# Patient Record
Sex: FEMALE | Race: WHITE | Employment: OTHER | URBAN - METROPOLITAN AREA
[De-identification: names, ages, dates, MRNs, and addresses within clinical notes are randomized per-mention and may not be internally consistent; named-entity substitution may affect disease eponyms.]

---

## 2019-01-01 ENCOUNTER — APPOINTMENT (OUTPATIENT)
Dept: GENERAL RADIOLOGY | Facility: HOSPITAL | Age: 74
DRG: 640 | End: 2019-01-01
Attending: INTERNAL MEDICINE
Payer: MEDICARE

## 2019-01-01 ENCOUNTER — HOSPITAL ENCOUNTER (INPATIENT)
Facility: HOSPITAL | Age: 74
LOS: 5 days | DRG: 640 | End: 2019-01-01
Attending: EMERGENCY MEDICINE | Admitting: HOSPITALIST
Payer: MEDICARE

## 2019-01-01 ENCOUNTER — APPOINTMENT (OUTPATIENT)
Dept: CT IMAGING | Facility: HOSPITAL | Age: 74
DRG: 640 | End: 2019-01-01
Attending: EMERGENCY MEDICINE
Payer: MEDICARE

## 2019-01-01 ENCOUNTER — APPOINTMENT (OUTPATIENT)
Dept: GENERAL RADIOLOGY | Facility: HOSPITAL | Age: 74
DRG: 640 | End: 2019-01-01
Attending: EMERGENCY MEDICINE
Payer: MEDICARE

## 2019-01-01 ENCOUNTER — APPOINTMENT (OUTPATIENT)
Dept: CT IMAGING | Facility: HOSPITAL | Age: 74
DRG: 640 | End: 2019-01-01
Attending: HOSPITALIST
Payer: MEDICARE

## 2019-01-01 ENCOUNTER — APPOINTMENT (OUTPATIENT)
Dept: GENERAL RADIOLOGY | Facility: HOSPITAL | Age: 74
DRG: 640 | End: 2019-01-01
Attending: HOSPITALIST
Payer: MEDICARE

## 2019-01-01 ENCOUNTER — APPOINTMENT (OUTPATIENT)
Dept: CV DIAGNOSTICS | Facility: HOSPITAL | Age: 74
DRG: 640 | End: 2019-01-01
Attending: INTERNAL MEDICINE
Payer: MEDICARE

## 2019-01-01 ENCOUNTER — APPOINTMENT (OUTPATIENT)
Dept: GENERAL RADIOLOGY | Facility: HOSPITAL | Age: 74
DRG: 640 | End: 2019-01-01
Attending: CLINICAL NURSE SPECIALIST
Payer: MEDICARE

## 2019-01-01 VITALS
WEIGHT: 126.31 LBS | BODY MASS INDEX: 22.95 KG/M2 | RESPIRATION RATE: 14 BRPM | HEART RATE: 84 BPM | HEIGHT: 62.01 IN | DIASTOLIC BLOOD PRESSURE: 33 MMHG | OXYGEN SATURATION: 83 % | TEMPERATURE: 96 F | SYSTOLIC BLOOD PRESSURE: 50 MMHG

## 2019-01-01 DIAGNOSIS — E87.2 LACTIC ACIDOSIS: Primary | ICD-10-CM

## 2019-01-01 DIAGNOSIS — E87.6 HYPOKALEMIA: ICD-10-CM

## 2019-01-01 DIAGNOSIS — K52.9 GASTROENTERITIS: ICD-10-CM

## 2019-01-01 DIAGNOSIS — N17.9 AKI (ACUTE KIDNEY INJURY) (HCC): ICD-10-CM

## 2019-01-01 PROCEDURE — 0BH17EZ INSERTION OF ENDOTRACHEAL AIRWAY INTO TRACHEA, VIA NATURAL OR ARTIFICIAL OPENING: ICD-10-PCS | Performed by: HOSPITALIST

## 2019-01-01 PROCEDURE — 99291 CRITICAL CARE FIRST HOUR: CPT | Performed by: INTERNAL MEDICINE

## 2019-01-01 PROCEDURE — 74019 RADEX ABDOMEN 2 VIEWS: CPT | Performed by: HOSPITALIST

## 2019-01-01 PROCEDURE — 93306 TTE W/DOPPLER COMPLETE: CPT | Performed by: INTERNAL MEDICINE

## 2019-01-01 PROCEDURE — 71045 X-RAY EXAM CHEST 1 VIEW: CPT | Performed by: HOSPITALIST

## 2019-01-01 PROCEDURE — 06HY33Z INSERTION OF INFUSION DEVICE INTO LOWER VEIN, PERCUTANEOUS APPROACH: ICD-10-PCS | Performed by: HOSPITALIST

## 2019-01-01 PROCEDURE — 5A12012 PERFORMANCE OF CARDIAC OUTPUT, SINGLE, MANUAL: ICD-10-PCS | Performed by: HOSPITALIST

## 2019-01-01 PROCEDURE — 99233 SBSQ HOSP IP/OBS HIGH 50: CPT | Performed by: HOSPITALIST

## 2019-01-01 PROCEDURE — 30233R1 TRANSFUSION OF NONAUTOLOGOUS PLATELETS INTO PERIPHERAL VEIN, PERCUTANEOUS APPROACH: ICD-10-PCS | Performed by: HOSPITALIST

## 2019-01-01 PROCEDURE — 71045 X-RAY EXAM CHEST 1 VIEW: CPT | Performed by: CLINICAL NURSE SPECIALIST

## 2019-01-01 PROCEDURE — 74176 CT ABD & PELVIS W/O CONTRAST: CPT | Performed by: EMERGENCY MEDICINE

## 2019-01-01 PROCEDURE — 99223 1ST HOSP IP/OBS HIGH 75: CPT | Performed by: HOSPITALIST

## 2019-01-01 PROCEDURE — 5A1935Z RESPIRATORY VENTILATION, LESS THAN 24 CONSECUTIVE HOURS: ICD-10-PCS | Performed by: HOSPITALIST

## 2019-01-01 PROCEDURE — 99223 1ST HOSP IP/OBS HIGH 75: CPT | Performed by: UROLOGY

## 2019-01-01 PROCEDURE — 71045 X-RAY EXAM CHEST 1 VIEW: CPT | Performed by: EMERGENCY MEDICINE

## 2019-01-01 PROCEDURE — 70450 CT HEAD/BRAIN W/O DYE: CPT | Performed by: HOSPITALIST

## 2019-01-01 RX ORDER — SODIUM CHLORIDE 0.9 % (FLUSH) 0.9 %
10 SYRINGE (ML) INJECTION AS NEEDED
Status: DISCONTINUED | OUTPATIENT
Start: 2019-01-01 | End: 2019-01-01

## 2019-01-01 RX ORDER — ASPIRIN 81 MG/1
81 TABLET, CHEWABLE ORAL DAILY
Status: DISCONTINUED | OUTPATIENT
Start: 2019-01-01 | End: 2019-01-01

## 2019-01-01 RX ORDER — HEPARIN SODIUM 5000 [USP'U]/ML
5000 INJECTION, SOLUTION INTRAVENOUS; SUBCUTANEOUS EVERY 12 HOURS SCHEDULED
Status: DISCONTINUED | OUTPATIENT
Start: 2019-01-01 | End: 2019-01-01

## 2019-01-01 RX ORDER — IPRATROPIUM BROMIDE AND ALBUTEROL SULFATE 2.5; .5 MG/3ML; MG/3ML
3 SOLUTION RESPIRATORY (INHALATION) EVERY 6 HOURS PRN
Status: DISCONTINUED | OUTPATIENT
Start: 2019-01-01 | End: 2019-01-01

## 2019-01-01 RX ORDER — ONDANSETRON 2 MG/ML
4 INJECTION INTRAMUSCULAR; INTRAVENOUS EVERY 6 HOURS PRN
Status: DISCONTINUED | OUTPATIENT
Start: 2019-01-01 | End: 2019-01-01

## 2019-01-01 RX ORDER — POTASSIUM CHLORIDE 20 MEQ/1
20 TABLET, EXTENDED RELEASE ORAL DAILY
COMMUNITY
Start: 2019-01-01

## 2019-01-01 RX ORDER — METOPROLOL SUCCINATE 50 MG/1
50 TABLET, EXTENDED RELEASE ORAL DAILY
COMMUNITY
Start: 2018-01-01

## 2019-01-01 RX ORDER — METOCLOPRAMIDE HYDROCHLORIDE 5 MG/ML
5 INJECTION INTRAMUSCULAR; INTRAVENOUS EVERY 6 HOURS
Status: DISCONTINUED | OUTPATIENT
Start: 2019-01-01 | End: 2019-01-01

## 2019-01-01 RX ORDER — METOPROLOL SUCCINATE 25 MG/1
25 TABLET, EXTENDED RELEASE ORAL DAILY
Status: DISCONTINUED | OUTPATIENT
Start: 2019-01-01 | End: 2019-01-01

## 2019-01-01 RX ORDER — ATORVASTATIN CALCIUM 40 MG/1
40 TABLET, FILM COATED ORAL NIGHTLY
Status: DISCONTINUED | OUTPATIENT
Start: 2019-01-01 | End: 2019-01-01

## 2019-01-01 RX ORDER — CLOPIDOGREL BISULFATE 75 MG/1
75 TABLET ORAL DAILY
COMMUNITY
Start: 2018-01-01

## 2019-01-01 RX ORDER — SODIUM CHLORIDE 9 MG/ML
INJECTION, SOLUTION INTRAVENOUS ONCE
Status: DISCONTINUED | OUTPATIENT
Start: 2019-01-01 | End: 2019-01-01

## 2019-01-01 RX ORDER — POTASSIUM CHLORIDE 20 MEQ/1
40 TABLET, EXTENDED RELEASE ORAL EVERY 4 HOURS
Status: COMPLETED | OUTPATIENT
Start: 2019-01-01 | End: 2019-01-01

## 2019-01-01 RX ORDER — HEPARIN SODIUM AND DEXTROSE 10000; 5 [USP'U]/100ML; G/100ML
INJECTION INTRAVENOUS CONTINUOUS
Status: DISCONTINUED | OUTPATIENT
Start: 2019-01-01 | End: 2019-01-01

## 2019-01-01 RX ORDER — ONDANSETRON 2 MG/ML
8 INJECTION INTRAMUSCULAR; INTRAVENOUS EVERY 4 HOURS PRN
Status: DISCONTINUED | OUTPATIENT
Start: 2019-01-01 | End: 2019-01-01

## 2019-01-01 RX ORDER — METOCLOPRAMIDE HYDROCHLORIDE 5 MG/ML
10 INJECTION INTRAMUSCULAR; INTRAVENOUS EVERY 8 HOURS PRN
Status: DISCONTINUED | OUTPATIENT
Start: 2019-01-01 | End: 2019-01-01

## 2019-01-01 RX ORDER — MAGNESIUM SULFATE HEPTAHYDRATE 40 MG/ML
2 INJECTION, SOLUTION INTRAVENOUS ONCE
Status: COMPLETED | OUTPATIENT
Start: 2019-01-01 | End: 2019-01-01

## 2019-01-01 RX ORDER — HEPARIN SODIUM AND DEXTROSE 10000; 5 [USP'U]/100ML; G/100ML
12 INJECTION INTRAVENOUS ONCE
Status: DISCONTINUED | OUTPATIENT
Start: 2019-01-01 | End: 2019-01-01

## 2019-01-01 RX ORDER — METOCLOPRAMIDE HYDROCHLORIDE 5 MG/ML
5 INJECTION INTRAMUSCULAR; INTRAVENOUS EVERY 8 HOURS PRN
Status: DISCONTINUED | OUTPATIENT
Start: 2019-01-01 | End: 2019-01-01

## 2019-01-01 RX ORDER — ACETAMINOPHEN 325 MG/1
650 TABLET ORAL EVERY 6 HOURS PRN
Status: DISCONTINUED | OUTPATIENT
Start: 2019-01-01 | End: 2019-01-01

## 2019-01-01 RX ORDER — CLOPIDOGREL BISULFATE 75 MG/1
75 TABLET ORAL DAILY
Status: DISCONTINUED | OUTPATIENT
Start: 2019-01-01 | End: 2019-01-01

## 2019-01-01 RX ORDER — HEPARIN SODIUM 1000 [USP'U]/ML
60 INJECTION, SOLUTION INTRAVENOUS; SUBCUTANEOUS ONCE
Status: DISCONTINUED | OUTPATIENT
Start: 2019-01-01 | End: 2019-01-01

## 2019-01-01 RX ORDER — POTASSIUM CHLORIDE 1.5 G/1.77G
40 POWDER, FOR SOLUTION ORAL ONCE
Status: COMPLETED | OUTPATIENT
Start: 2019-01-01 | End: 2019-01-01

## 2019-01-01 RX ORDER — MORPHINE SULFATE 4 MG/ML
4 INJECTION, SOLUTION INTRAMUSCULAR; INTRAVENOUS ONCE
Status: DISCONTINUED | OUTPATIENT
Start: 2019-01-01 | End: 2019-01-01

## 2019-01-01 RX ORDER — SODIUM CHLORIDE 9 MG/ML
INJECTION, SOLUTION INTRAVENOUS CONTINUOUS
Status: DISCONTINUED | OUTPATIENT
Start: 2019-01-01 | End: 2019-01-01

## 2019-01-01 RX ORDER — ONDANSETRON 2 MG/ML
4 INJECTION INTRAMUSCULAR; INTRAVENOUS ONCE
Status: COMPLETED | OUTPATIENT
Start: 2019-01-01 | End: 2019-01-01

## 2019-01-01 RX ORDER — LOSARTAN POTASSIUM 50 MG/1
50 TABLET ORAL DAILY
COMMUNITY
Start: 2019-01-01

## 2019-01-01 RX ORDER — SODIUM CHLORIDE 0.9 % (FLUSH) 0.9 %
3 SYRINGE (ML) INJECTION AS NEEDED
Status: DISCONTINUED | OUTPATIENT
Start: 2019-01-01 | End: 2019-01-01

## 2019-04-05 PROBLEM — E87.2 LACTIC ACIDOSIS: Status: ACTIVE | Noted: 2019-01-01

## 2019-04-06 PROBLEM — N17.9 AKI (ACUTE KIDNEY INJURY) (HCC): Status: ACTIVE | Noted: 2019-01-01

## 2019-04-06 PROBLEM — E87.6 HYPOKALEMIA: Status: ACTIVE | Noted: 2019-01-01

## 2019-04-06 PROBLEM — K52.9 GASTROENTERITIS: Status: ACTIVE | Noted: 2019-01-01

## 2019-04-06 NOTE — ED NOTES
Pt brought to ED by son who went to visit today after his father reported that pt was \"not doing well\". Son and father attempted to get pt into bathtub, but pt was not able to stand. Son reports that pt is \"very weak\" compared to her baseline.   Per s

## 2019-04-06 NOTE — H&P
[facility]    PATIENT'S NAME: Zahira Shannon PHYSICIAN: Dean Luna MD   PATIENT ACCOUNT#:   [id number]    LOCATION:  [address] [place] [address] #:   [id number]       YOB: 1945  ADMISSION DATE:       04/0 squamous epithelial cells and no bacteria. Chest x-ray revealed no acute cardiopulmonary disease. ICD was present. EKG revealed left bundle-branch block, sinus rhythm, with no previous EKG for comparison. Potassium was 2.5. Glucose was 171.   CT scan o an MI; she also had a tumor on her back. Her father  at 68 of an MI.    SOCIAL HISTORY:  The patient was a smoker, has at least a 35-pack-year history of tobacco use but quit in  after her heart attack. She does not drink, does not use drugs.   Sh diarrhea. NEUROLOGIC:  The patient was sleepy but arousable, able to provide a decent history. She was generally weak, but there were no focal motor or sensory deficits. PSYCHIATRIC:  Her mood and affect were depressed but cooperative.   BACK:  There was lack of information. There is no Care Everywhere in the computer. If we continue to follow the patient, we will try to obtain medical records from [place].   3.   Coronary artery disease, status post myocardial infarction in 2005 with cardiac arrest, pacem Will hold heparin because of the low platelet count. 12.   The patient's current clinical status and proposed treatment plan were discussed with her and her family at the bedside.   All their questions were answered, and they agreed with the plan of care

## 2019-04-06 NOTE — PLAN OF CARE
Problem: Patient Centered Care  Goal: Patient preferences are identified and integrated in the patient's plan of care  Description  Interventions:  - What would you like us to know as we care for you? Home with  and son.  Hx of st. 4 liver CA- on r Larissa Tay, RN  Outcome: Progressing     Problem: PAIN - ADULT  Goal: Verbalizes/displays adequate comfort level or patient's stated pain goal  Description  INTERVENTIONS:  - Encourage pt to monitor pain and request assistance  - Assess pain using

## 2019-04-06 NOTE — ED PROVIDER NOTES
Patient Seen in: [facility] AND [facility] Emergency Department    History   Patient presents with:  Dehydration (metabolic/constitutional)  Nausea/Vomiting/Diarrhea (gastrointestinal)    Stated Complaint: NVD, hx cancer    HPI    78-year-old female with past me post OP clear with no exudates  Eyes: Conjunctivae and EOM are normal. PERRLA  Neck: Normal range of motion. Neck supple. No tracheal deviation present. CV: s1s2+, RRR, no m/r/g, normal distal pulses  Pulmonary/Chest: CTA b/l with no rales, wheezes.   No RDW 18.1 (*)     PLT 53.0 (*)     Lymphocyte Absolute 0.64 (*)     All other components within normal limits   MAGNESIUM - Normal   CBC WITH DIFFERENTIAL WITH PLATELET    Narrative:      The following orders were created for panel order CBC WITH DIFFERENTIA above.    DD:  04/05/2019/DT:  04/06/2019        MDM   Patient has significant dehydration with lactic acidosis of 8. She has severe hypokalemia with a potassium of 2.5. She also has some mild acute kidney injury.   Patient is starting to feel somewhat be

## 2019-04-06 NOTE — PROGRESS NOTES
Patient was admitted over night. Patient is unsure of home medications. Attempted to call , Quinn, via telephone to obtain med rec and there was no response. Will attempt to call again at another time.

## 2019-04-07 NOTE — PLAN OF CARE
Problem: Patient Centered Care  Goal: Patient preferences are identified and integrated in the patient's plan of care  Description  Interventions:  - What would you like us to know as we care for you? Lives at home with  and son.   - Provide timely evaluate response  - Implement non-pharmacological measures as appropriate and evaluate response  - Consider cultural and social influences on pain and pain management  - Manage/alleviate anxiety  - Utilize distraction and/or relaxation techniques  - Monit

## 2019-04-07 NOTE — PROGRESS NOTES
[place] [facility] - [facility]    Progress Note    Agnes Major Patient Status:  Inpatient    1945 MRN [id number]   Location [facility] 3W/SW Attending Anne Bee Day # 1 PCP Renee Arguello MD        Subjective:     Co me whether he is admitting to [facility] AND [facility] or not. Will talk with the daytime hospitalist and consider discussing her case with Dr. Nixon Knowles, if available. He may wish to transfer care as well.   We are working with a significant lack of informati thrombocytopenia. Platelet count is above 50,000 and slide is to be reviewed. Will await review of slide. If the patient's platelets are clumped, then the total number is likely normal.  Again, will try to obtain records from [facility], including labs.   6 acute cardiopulmonary disease. 2. ICD with transvenous leads in right atrium and right ventricle.     Dictated by (CST): Mamadou Robbins MD on 4/06/2019 at 6:33     Approved by (CST): Mamadou Robbins MD on 4/06/2019 at 6:34          Ekg 12-lead    R

## 2019-04-07 NOTE — PHYSICAL THERAPY NOTE
PHYSICAL THERAPY EVALUATION - INPATIENT     Room Number: 321/321-A  Evaluation Date: 4/7/2019  Type of Evaluation: New  Physician Order: PT Eval and Treat    Presenting Problem: dehydration, dry mouth, lactic acidosis  Reason for Therapy: Mobility Dysfunc acidosis  Active Problems:    Gastroenteritis    Hypokalemia    LUCY (acute kidney injury) [facility])      Past Medical History  Past Medical History:   Diagnosis Date   • Liver cancer [facility])        Past Surgical History  History reviewed.  No pertinent surgical h etc.): A Lot   -   Moving from lying on back to sitting on the side of the bed?: A Lot   How much help from another person does the patient currently need. ..   -   Moving to and from a bed to a chair (including a wheelchair)?: A Lot   -   Need to walk in h

## 2019-04-07 NOTE — PLAN OF CARE
Problem: Patient Centered Care  Goal: Patient preferences are identified and integrated in the patient's plan of care  Description  Interventions:  - What would you like us to know as we care for you?  I live at home with my   - Provide timely, com cardiac monitoring, monitor vital signs, obtain 12 lead EKG if indicated  - Evaluate effectiveness of antiarrhythmic and heart rate control medications as ordered  - Initiate emergency measures for life threatening arrhythmias  - Monitor electrolytes and a and/or skin breakdown  - Initiate interventions, skin care algorithm/standards of care as needed  Outcome: Progressing  Goal: Oral mucous membranes remain intact  Description  INTERVENTIONS  - Assess oral mucosa and hygiene practices  - Implement preventat

## 2019-04-07 NOTE — OCCUPATIONAL THERAPY NOTE
OCCUPATIONAL THERAPY EVALUATION - INPATIENT     Room Number: 321/321-A  Evaluation Date: 4/7/2019  Type of Evaluation: Initial  Presenting Problem: N/V/D, weakness and confusion ; severe dehydration with lactic acidosis    Physician Order: IP Consult to [facility] Pt with BM --provided rose care and linen change with total A. Pt provided CGA for log rolling x 3 times.   Pt's upper extremity strength and range of motion are [facility] for oral facial hygiene, upper extremity dressing, and self- feeding in supported sitting p Impaired  Arousal/Alertness:  lethargic  Orientation Level:  oriented to person, disoriented to place, disoriented to time and disoriented to situation  Memory:  decreased recall of biographical information and decreased recall of recent events  Following complete toileting with Min A  Comment:     Patient will tolerate standing for 4 minutes in prep for adls with Min A   Comment:    Patient will complete grooming in sitting EOB with SBA for sitting balance   Comment:          Goals  on:   Alysha Lopez

## 2019-04-07 NOTE — PROGRESS NOTES
Pt seen and examined, following on night admission, for details, please see H&P  Feels somewhat better, no vomiting, feels thirsty  No diarrhea  More awake  No chest pain, no palpitations  -start diet, advance as tolerated  -cont IVF for now  -check B12 an

## 2019-04-08 NOTE — DIETARY NOTE
ADULT NUTRITION INITIAL ASSESSMENT    Pt is at moderate nutrition risk. Pt does not meet malnutrition criteria.       RECOMMENDATIONS TO MD:  See Nutrition Intervention     NUTRITION DIAGNOSIS/PROBLEM:  Inadequate oral intake related to alteration in GI (n declined  Food Allergies: NKFA  Cultural/Ethnic/Gnosticist Preferences: Not Obtained    MEDICATIONS: reviewed  IV fluids(NS) at 50 ml/kp=0853 ml per 24 hours  • Potassium Chloride ER  40 mEq Oral Q4H   • Clopidogrel Bisulfate  75 mg Oral Daily   • Metoprolo

## 2019-04-08 NOTE — PLAN OF CARE
Problem: ALTERED NUTRIENT INTAKE - ADULT  Goal: Nutrient intake appropriate for improving, restoring or maintaining nutritional needs  Description  INTERVENTIONS:  - Assess nutritional status and recommend course of action  - Monitor oral intake, labs, a

## 2019-04-08 NOTE — PLAN OF CARE
Problem: Patient Centered Care  Goal: Patient preferences are identified and integrated in the patient's plan of care  Description  Interventions:  - What would you like us to know as we care for you?   - Provide timely, complete, and accurate informatio color and temperature  - Assess for signs of decreased coronary artery perfusion - ex.  Angina  - Evaluate fluid balance, assess for edema, trend weights  4/8/2019 0018 by Ifeanyi Mack RN  Outcome: Progressing  4/8/2019 0017 by PRIYANKA Briggs patient safety including physical limitations  - Instruct pt to call for assistance with activity based on assessment  - Modify environment to reduce risk of injury  - Provide assistive devices as appropriate  - Consider OT/PT consult to assist with streng

## 2019-04-08 NOTE — OCCUPATIONAL THERAPY NOTE
OCCUPATIONAL THERAPY TREATMENT NOTE - INPATIENT        Room Number: 321/321-A           Presenting Problem: N/V/D, weakness and confusion ; severe dehydration with lactic acidosis    Problem List  Principal Problem:    Lactic acidosis  Active Problems: Position: Lying    O2 SATURATIONS  SPO2 on Room Air at Rest: 94             ACTIVITIES OF DAILY LIVING ASSESSMENT  AM-PAC ‘6-Clicks’ Inpatient Daily Activity Short Form  How much help from another person does the patient currently need…  -   Putting on and  on:   Frequency: 3-4x/wk    Annalise Archibald MA, OTR/L  Occupational Therapist

## 2019-04-08 NOTE — PLAN OF CARE
Problem: Patient/Family Goals  Goal: Patient/Family Long Term Goal  Description  Patient's Long Term Goal:  \"go back to taking care of myself\"    Interventions:  - Work with physical therapy and occupational therapy  - up to chair during meals  - danya Manage/alleviate anxiety  - Utilize distraction and/or relaxation techniques  - Monitor for opioid side effects  - Notify MD/LIP if interventions unsuccessful or patient reports new pain  - Anticipate increased pain with activity and pre-medicate as approp patency with patient fatigue and changes in neurological status  - Encourage and assist patient to increase activity and self care with guidance from PT/OT  - Encourage visually impaired, hearing impaired and aphasic patients to use assistive/communication

## 2019-04-08 NOTE — PHYSICAL THERAPY NOTE
Attempted treatment pt on hold this afternoon per RN to let pt rest. Will follow up tomorrow if appropriate.

## 2019-04-08 NOTE — CM/SW NOTE
SW self-referred to review pt's case due to case finding and dx. SW attempted to call pt's son, Clarissa Lentz [phone] - no answer, no voicemail set up. SW spoke w/ pt's , Gene for initial assessment. Pt lives at home w/ her  in [address] [place] [address].  Pt l

## 2019-04-08 NOTE — PROGRESS NOTES
[facility]    Progress Note    Edmar Gins Patient Status:  Inpatient    1945 MRN [id number]   Location [facility] 3W/SW Attending Anne Bee Day # 2 PCP Bella Slade MD        Subjective:     Co unclear as to whether this is new or old.   4.       Stage IV liver cancer, apparently with radiation treatments.  pt metastatic carcinoid  5.       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  Nonspecific abdominal bowel gas pattern as discussed without

## 2019-04-09 NOTE — PROGRESS NOTES
[place] [facility] - [facility]    Progress Note    Riaz Atkins Patient Status:  Inpatient    1945 MRN [id number]   Location [facility] 3W/SW Attending Anne Bee Day # 3 PCP Mike Ramos MD        Subjective:     Co elderly female with a history of coronary artery disease.   EKG is unhelpful because of the left bundle-branch block, and it is unclear as to whether this is new or old.   4.       Stage IV met carcinoid, apparently with radiation treatments.  pt ct similar care                      Results:     Lab Results   Component Value Date    WBC 7.5 04/09/2019    HGB 9.6 (L) 04/09/2019    HCT 31.7 (L) 04/09/2019    PLT 41.0 (L) 04/09/2019    CREATSERUM 1.43 (H) 04/09/2019    BUN 22 (H) 04/09/2019     04/09/2019

## 2019-04-09 NOTE — PLAN OF CARE
Problem: Patient Centered Care  Goal: Patient preferences are identified and integrated in the patient's plan of care  Description  Interventions:  - What would you like us to know as we care for you?  I live at home with my    - Provide timely, co response  - Implement non-pharmacological measures as appropriate and evaluate response  - Consider cultural and social influences on pain and pain management  - Manage/alleviate anxiety  - Utilize distraction and/or relaxation techniques  - Monitor for op volume within ordered parameters to optimize cerebral perfusion and minimize risk of hemorrhage  - Monitor temperature, glucose, and sodium.  Initiate appropriate interventions as ordered  Outcome: Progressing   Pt in bed, wheels locked, call light within r

## 2019-04-09 NOTE — PHYSICAL THERAPY NOTE
PHYSICAL THERAPY TREATMENT NOTE - INPATIENT     Room Number: 321/321-A       Presenting Problem: dehydration, dry mouth, lactic acidosis    Problem List  Principal Problem:    Lactic acidosis  Active Problems:    Gastroenteritis    Hypokalemia    LUCY (acut Standing: Not tested  Dynamic Standing: Not tested    ACTIVITY TOLERANCE                         O2 WALK                  AM-PAC '6-Clicks' INPATIENT SHORT FORM - BASIC MOBILITY  How much difficulty does the patient currently have. ..  -   Turning over in b minutes CGA   Goal #5 Patient to demonstrate CG with home activity/exercise instructions provided to patient in preparation for discharge.    Goal #5   Current Status IN PROGRESS   Goal #6    Goal #6  Current Status

## 2019-04-09 NOTE — PLAN OF CARE
Problem: Patient Centered Care  Goal: Patient preferences are identified and integrated in the patient's plan of care  Description  Interventions:  - What would you like us to know as we care for you? Usually walks with walker at home.    - Provide timely Problem: PAIN - ADULT  Goal: Verbalizes/displays adequate comfort level or patient's stated pain goal  Description  INTERVENTIONS:  - Encourage pt to monitor pain and request assistance  - Assess pain using appropriate pain scale  - Administer analgesics Bella Durand RN  Outcome: Progressing  4/9/2019 0451 by Ifeanyi Mack RN  Outcome: Progressing  Goal: Oral mucous membranes remain intact  Description  INTERVENTIONS  - Assess oral mucosa and hygiene practices  - Implement preventative oral hygiene regime

## 2019-04-09 NOTE — PROGRESS NOTES
[facility] Pharmacy Note:  Renal Dose Adjustment for Metoclopramide (REGLAN)    Yvonne Hartman has been prescribed Metoclopramide (REGLAN) 10 mg every 6 hours. Estimated Creatinine Clearance: 27.3 mL/min (A) (based on SCr of 1.43 mg/dL (H)).     Her calculated

## 2019-04-09 NOTE — PROGRESS NOTES
[facility] Pharmacy Note:  Renal Dose Adjustment for Metoclopramide (REGLAN)    Kamala Burrell has been prescribed Metoclopramide (REGLAN) 10 mg every 8 hours as needed. Estimated Creatinine Clearance: 27.3 mL/min (A) (based on SCr of 1.43 mg/dL (H)).     Her ca

## 2019-04-10 NOTE — PHYSICAL THERAPY NOTE
Reviewed pt's chart for PT treatment attempt- pt was a CODE BLUE on 3rd floor this a.m. And now on mechanical ventilation (transferred to CCU). Will require updated orders when PT intervention appropriate to resume.   Thank you  Ilsa Gallagher, PT, DPT

## 2019-04-10 NOTE — CONSULTS
[facility]OSP - [facility]    Report of Consultation    Leandro Hollingsworth Patient Status:  Inpatient    1945 MRN [id number]   Location [facility] 2W/SW Attending Henry Jerez MD   Hosp Day # 4 PCP Vu Milton MD     Date of Admission: ondansetron HCl (ZOFRAN) injection 8 mg 8 mg Intravenous Q4H PRN   Vancomycin HCl (FIRVANQ) 50 MG/ML oral solution 125 mg 125 mg Oral Q6H   ipratropium-albuterol (DUONEB) nebulizer solution 3 mL 3 mL Nebulization Q6H PRN   Normal Saline Flush 0.9 % injec Intravenous Once       Continuous Infusions:   • norepinephrine 20 mcg/min (04/10/19 9769)   • sodium chloride 83 mL/hr at 04/10/19 1054       Head: Normocephalic, without obvious abnormality, atraumatic  Pulmonary: clear to auscultation bilaterally  Cardi positioning. 3. No pneumothorax    Dictated by (CST): Sin Covarrubias MD on 4/10/2019 at 7:59     Approved by (CST): Sin Covarrubias MD on 4/10/2019 at 8:04          Xr Chest Ap Portable  (cpt=71045)    Result Date: 4/10/2019  CONCLUSION:  1.  The ET tube

## 2019-04-10 NOTE — PROGRESS NOTES
Paged and spoke with urologist ,Md will be coming soon,no urine output noted on pt.family at bed side.

## 2019-04-10 NOTE — OCCUPATIONAL THERAPY NOTE
Chart reviewed. Pt was a CODE BLUE on 3rd floor this a.m. And now on mechanical ventilation (transferred to CCU). Will require updated orders when OT intervention appropriate to resume.

## 2019-04-10 NOTE — CONSULTS
[facility]OSP - [facility]    Report of Consultation    Leandro Hollingsworth Patient Status:  Inpatient    1945 MRN [id number]   Location [facility] 2W/SW Attending Henry Jerez MD   Hosp Day # 4 PCP Vu Milton MD     Date of Admission: Thrombocytopenia ([facility] [address]      History reviewed. No pertinent surgical history. History reviewed. No pertinent family history.   Social History:  Social History    Tobacco Use      Smoking status: Former Smoker        Packs/day: 0.50        Years: 35.00 ALT 71 (H) 04/10/2019    INR 1.71 (H) 04/10/2019    T4F 0.4 (L) 04/07/2019    TSH 14.600 (H) 04/07/2019     04/10/2019    MG 1.7 04/10/2019    PHOS 4.9 04/10/2019    TROP 0.676 (HH) 04/10/2019    B12 372 04/07/2019     CT A/P 4/6/2019 without IV con

## 2019-04-10 NOTE — PROGRESS NOTES
Patient lethargic but arousable-perks up to verbal communication. Alert to self, aware she's in the hospital-thought it was alexian brothers. Extremities cold, unable to obtain accurate pulse ox. Increased edema to lower extremities. BP 95/52, MD notified.

## 2019-04-10 NOTE — PLAN OF CARE
Problem: Patient Centered Care  Goal: Patient preferences are identified and integrated in the patient's plan of care  Description  Interventions:  - What would you like us to know as we care for you?   - Provide timely, complete, and accurate informatio with IV or PO as ordered and tolerated  - Nasogastric tube to low intermittent suction as ordered  - Evaluate effectiveness of ordered antiemetic medications  - Provide nonpharmacologic comfort measures as appropriate  - Advance diet as tolerated, if order

## 2019-04-10 NOTE — SIGNIFICANT EVENT
TRAVIS SMITH called to room 321. Patient unresponsive, agonal breathing, as per RN no palpable pulse. Patient earlier had been lethargic however responding to verbal commands, denying any complaints at that time.   On my arrival CPR in progress, pulse now

## 2019-04-10 NOTE — PLAN OF CARE
Problem: CARDIOVASCULAR - ADULT  Goal: Maintains optimal cardiac output and hemodynamic stability  Description  INTERVENTIONS:  - Monitor vital signs, rhythm, and trends  - Monitor for bleeding, hypotension and signs of decreased cardiac output  - Evalua pre-medicate as appropriate  4/9/2019 2344 by Dilan Reed RN  Outcome: Progressing  4/9/2019 2339 by Dilan Reed, RN  Outcome: Progressing     Problem: SAFETY ADULT - FALL  Goal: Free from fall injury  Description  INTERVENTIONS:  - Assess pt fr volume within ordered parameters to optimize cerebral perfusion and minimize risk of hemorrhage  - Monitor temperature, glucose, and sodium.  Initiate appropriate interventions as ordered  4/9/2019 2344 by Micah Rankin RN  Outcome: Progressing  4/9/201 Octavio Ridley RN  Outcome: Not Progressing  Goal: Patient/Family Short Term Goal  Description  Patient's Short Term Goal: \"feel better\"    Interventions:   - IVF  - Clear liquid diet and advance as tolerated  - PT/OT consult  -   - See additional Car

## 2019-04-10 NOTE — CONSULTS
Pulmonary/Critical Care Consultation Note    HPI:   Jermaine Williamson is a 75year old female with Patient presents with:  Dehydration (metabolic/constitutional)  Nausea/Vomiting/Diarrhea (gastrointestinal)    Lizandro Leger MD    Pt is a 74 yo with [facility] potassium chloride 40 mEq in sodium chloride 0.9% 250 mL IVPB 40 mEq Intravenous Once   Normal Saline Flush 0.9 % injection 3 mL 3 mL Intravenous PRN   0.9%  NaCl infusion  Intravenous Continuous   [COMPLETED] potassium chloride 40 mEq in sodium chloride 0 Intake 4568 ml   Output 50 ml   Net 4518 ml     NAD  Seems to be able to follow commands  Head AT/NC  Anicteric  Lung course BS  CV reg   Abd soft NT/ND  Ext cool   Skin No rash      LABS  Lab Results   Component Value Date    WBC 2.8 04/10/2019    HGB 9

## 2019-04-10 NOTE — PROGRESS NOTES
No urine output noted on patient bladder scan done and more than 400 ml urine noted. unable to place nassar on pt by this RN and other RN. spoke with  and urology consult done. urology NP at bed side and try to insert nassar unsuccessfull.waiting for ur

## 2019-04-10 NOTE — PLAN OF CARE
Problem: Patient Centered Care  Goal: Patient preferences are identified and integrated in the patient's plan of care  Description  Interventions:  - What would you like us to know as we care for you?  - Provide timely, complete, and accurate information Progressing     Problem: SAFETY ADULT - FALL  Goal: Free from fall injury  Description  INTERVENTIONS:  - Assess pt frequently for physical needs  - Identify cognitive and physical deficits and behaviors that affect risk of falls.   - [place] fall precaut nutritional consult as needed  - Evaluate fluid balance  Outcome: Progressing  Goal: Maintains or returns to baseline bowel function  Description  INTERVENTIONS:  - Assess bowel function  - Maintain adequate hydration with IV or PO as ordered and tolerated electrolytes and administer replacement therapy as ordered  Outcome: Not Progressing     Problem: SKIN/TISSUE INTEGRITY - ADULT  Goal: Skin integrity remains intact  Description  INTERVENTIONS  - Assess and document risk factors for pressure ulcer developm monitor pt.

## 2019-04-10 NOTE — SEPSIS REASSESSMENT
[facility] - [facility]      Sepsis Reassessment Note    /59 (BP Location: Right arm)   Pulse 69   Temp 96.7 °F (35.9 °C) (Temporal)   Resp 21   Ht 5' 2\" (1.575 m)   Wt 126 lb 4.8 oz (57.3 kg)   SpO2 100%   BMI 23.10 kg/m²      4:48 AM    Cardi

## 2019-04-10 NOTE — CODE DOCUMENTATION
Code Blue    *See Code Blue Documentation Record*    Reason the code blue was called: patient found unresponsive with agonal breathing  Assessment of patient leading up to code: irregular breathing, unable to obtain accurate O2 saturation-cool extremities,

## 2019-04-11 NOTE — PLAN OF CARE
Problem: PAIN - ADULT  Goal: Verbalizes/displays adequate comfort level or patient's stated pain goal  Description  INTERVENTIONS:  - Encourage pt to monitor pain and request assistance  - Assess pain using appropriate pain scale  - Administer analgesics Jatin Ag RN  Outcome: Progressing  4/10/2019 0649 by Wilner Sierra RN  Outcome: Not Progressing  Goal: Oral mucous membranes remain intact  Description  INTERVENTIONS  - Assess oral mucosa and hygiene practices  - Implement preventative oral hygiene regime Problem: CARDIOVASCULAR - ADULT  Goal: Maintains optimal cardiac output and hemodynamic stability  Description  INTERVENTIONS:  - Monitor vital signs, rhythm, and trends  - Monitor for bleeding, hypotension and signs of decreased cardiac output  - Evalua patency with patient fatigue and changes in neurological status  - Encourage and assist patient to increase activity and self care with guidance from PT/OT  - Encourage visually impaired, hearing impaired and aphasic patients to use assistive/communication Enhance eating environment  4/10/2019 2020 by Elmo Patrick RN  Outcome: Not Progressing  4/10/2019 0649 by Elmo Patrick RN  Outcome: Progressing     Problem: RESPIRATORY - ADULT  Goal: Achieves optimal ventilation and oxygenation  Description  INTERV tube. Bite guard placed, as pt is biting down on ETT. Will continue to monitor pt.

## 2019-04-11 NOTE — PROGRESS NOTES
[facility] called, spoke with Dahlia Teocosta, case number [id number]. Personal items were taken by family, all drains removed, patient sent to [facility] [place].

## 2019-04-11 NOTE — SIGNIFICANT EVENT
4729: Pt having widening QRS. RN reported to bedside to check for femoral pulse via doppler. No pulse found with doppler, pt in PEA. 200: Initiated CPR with Zahra PCT. Code blue called.  VS: 126/104, , R: 13  0643: 1 mg Epi given per Unique Ruiz MD. CPR

## 2019-04-11 NOTE — PROGRESS NOTES
Paged and spoke with  regarding the critical values. order for give one unit platelet. pt blood pressure dropped and order placed for vasopressin.  urologist at bed side and placing nassar catheter. family updated pt status. will continue to

## 2019-04-15 ENCOUNTER — TELEPHONE (OUTPATIENT)
Dept: PULMONOLOGY | Facility: CLINIC | Age: 74
End: 2019-04-15

## 2019-04-15 NOTE — TELEPHONE ENCOUNTER
Roselia states she faxed over death certificate for signature from dr Alvarez Alt Friday and would like to know the status please call

## 2019-04-16 NOTE — TELEPHONE ENCOUNTER
Notified Roselia of below. Explained will make corrections to death cert & ask Dr. Adolfo Choudhary to complete death cert when he returns to clinic tomorrow. She voiced understanding. Death cert placed in MD's folder.

## 2019-04-17 NOTE — TELEPHONE ENCOUNTER
Completed death cert faxed to #[phone]. Conf rcvd. Notified Roselia of this. She voiced understanding. Per Roselia death cert rcvd.

## 2019-05-20 NOTE — DISCHARGE SUMMARY
[facility]  Discharge Summary    Michelle Lewis Patient Status:  Inpatient    1945 MRN [id number]   Location [facility] 2W/SW Attending No att. providers found   [facility] Day # 5 PCP Nitin Fan MD     Date of Admission: